# Patient Record
Sex: FEMALE | Race: WHITE
[De-identification: names, ages, dates, MRNs, and addresses within clinical notes are randomized per-mention and may not be internally consistent; named-entity substitution may affect disease eponyms.]

---

## 2019-09-13 ENCOUNTER — HOSPITAL ENCOUNTER (EMERGENCY)
Dept: HOSPITAL 44 - ED | Age: 64
Discharge: HOME | End: 2019-09-13
Payer: COMMERCIAL

## 2019-09-13 VITALS — DIASTOLIC BLOOD PRESSURE: 109 MMHG | SYSTOLIC BLOOD PRESSURE: 169 MMHG

## 2019-09-13 DIAGNOSIS — L23.7: Primary | ICD-10-CM

## 2019-09-13 PROCEDURE — 80053 COMPREHEN METABOLIC PANEL: CPT

## 2019-09-13 PROCEDURE — 96372 THER/PROPH/DIAG INJ SC/IM: CPT

## 2019-09-13 PROCEDURE — 99281 EMR DPT VST MAYX REQ PHY/QHP: CPT

## 2019-09-13 PROCEDURE — 99283 EMERGENCY DEPT VISIT LOW MDM: CPT

## 2019-09-13 NOTE — ED PHYSICIAN DOCUMENTATION
General Adult





- HISTORIAN


Historian: patient





- HPI


Stated Complaint: poison ivy


Chief Complaint: General Adult


Onset: days ago (1)


Timing: still present


Severity: moderate


Further Comments: yes (Pt is a 65 yo female with rash on upper extremities and 

trunk x 1 day.  Pt had been out cutting trees when this bagan.  Pt used some 

anti-itch cream, but this has not been too helpful.)





- ROS


CONST: no problems


EYES/ENT: none


CVS/RESP: none


GI/: none


MS/SKIN/LYMPH: rash





- PAST HX


Past History: other (CVA/TIA)


Allergies/Adverse Reactions: 


                                    Allergies











Allergy/AdvReac Type Severity Reaction Status Date / Time


 


codeine Allergy   Verified 09/13/19 10:04














Home Medications: 


                                Ambulatory Orders











 Medication  Instructions  Recorded


 


NK  09/13/19














- SOCIAL HX


Smoking History: cigarettes





- FAMILY HX


Family History: No





- VITAL SIGNS


Vital Signs: 





                                   Vital Signs











Temp Pulse Resp BP Pulse Ox


 


 98.2 F   81   20   169/109   97 


 


 09/13/19 10:00  09/13/19 10:00  09/13/19 10:00  09/13/19 10:00  09/13/19 10:00














- REVIEWED ASSESSMENTS


Nursing Assessment  Reviewed: Yes


Vitals Reviewed: Yes





Progress





- Progress


Progress: 


Solu-medrol 125 mg IM in ER.








Rx Prednisone 10 mg.  Take 6 tablets at one time by mouth once daily for 2 days;

then take 5 tabs by mouth once daily for 2 days; Then take 4 tabs by mouth once 

daily for 2 days; then take 3 tablets by mouth once daily for 2 days; then take 

2 tabs by mouth once daily for 2 days; then take 1 tablet by mouth once daily 

for 2 days; then stop.





May also use over-the-counter Benadryl as directed and over-the-counter 

Hydrocortisone cream.





General Adult Physical Exam





- PHYSICAL EXAM


GENERAL APPEARANCE: no distress


EENT: eye inspection normal


NECK: normal inspection, supple


RESPIRATORY: no resp distress, chest non-tender, breath sounds normal


CVS: reg rate & rhythm, heart sounds normal


ABDOMEN: soft, no organomegaly, normal bowel sounds


BACK: normal inspection


SKIN: other (rash uppper extremities, trunk c/w poison ivy)


EXTREMITIES: non-tender, normal range of motion, no evidence of injury


NEURO: oriented X3, motor nml, sensation nml





Discharge


Clincal Impression: 


 Poison ivy





Condition: Good


Disposition: 01 HOME, SELF-CARE


Decision to Admit: NO


Decision Time: 10:21